# Patient Record
Sex: MALE | Race: WHITE | NOT HISPANIC OR LATINO | Employment: OTHER | URBAN - METROPOLITAN AREA
[De-identification: names, ages, dates, MRNs, and addresses within clinical notes are randomized per-mention and may not be internally consistent; named-entity substitution may affect disease eponyms.]

---

## 2020-09-20 ENCOUNTER — HOSPITAL ENCOUNTER (EMERGENCY)
Facility: HOSPITAL | Age: 45
Discharge: HOME | End: 2020-09-20
Attending: EMERGENCY MEDICINE
Payer: OTHER GOVERNMENT

## 2020-09-20 VITALS
TEMPERATURE: 98 F | SYSTOLIC BLOOD PRESSURE: 136 MMHG | RESPIRATION RATE: 16 BRPM | BODY MASS INDEX: 27.92 KG/M2 | HEIGHT: 70 IN | DIASTOLIC BLOOD PRESSURE: 86 MMHG | OXYGEN SATURATION: 96 % | WEIGHT: 195 LBS | HEART RATE: 68 BPM

## 2020-09-20 DIAGNOSIS — R07.9 CHEST PAIN: Primary | ICD-10-CM

## 2020-09-20 LAB
ALBUMIN SERPL BCP-MCNC: 4.7 G/DL (ref 3.5–5.2)
ALP SERPL-CCNC: 37 U/L (ref 55–135)
ALT SERPL W/O P-5'-P-CCNC: 55 U/L (ref 10–44)
ANION GAP SERPL CALC-SCNC: 12 MMOL/L (ref 8–16)
AST SERPL-CCNC: 28 U/L (ref 10–40)
BASOPHILS # BLD AUTO: 0.08 K/UL (ref 0–0.2)
BASOPHILS NFR BLD: 1.3 % (ref 0–1.9)
BILIRUB SERPL-MCNC: 1.5 MG/DL (ref 0.1–1)
BUN SERPL-MCNC: 11 MG/DL (ref 6–20)
CALCIUM SERPL-MCNC: 9 MG/DL (ref 8.7–10.5)
CHLORIDE SERPL-SCNC: 105 MMOL/L (ref 95–110)
CK MB SERPL-MCNC: 1.2 NG/ML (ref 0.1–6.5)
CK SERPL-CCNC: 166 U/L (ref 20–200)
CO2 SERPL-SCNC: 24 MMOL/L (ref 23–29)
CREAT SERPL-MCNC: 0.8 MG/DL (ref 0.5–1.4)
D DIMER PPP IA.FEU-MCNC: 0.49 UG/ML FEU
DIFFERENTIAL METHOD: NORMAL
EOSINOPHIL # BLD AUTO: 0.4 K/UL (ref 0–0.5)
EOSINOPHIL NFR BLD: 5.8 % (ref 0–8)
ERYTHROCYTE [DISTWIDTH] IN BLOOD BY AUTOMATED COUNT: 11.5 % (ref 11.5–14.5)
EST. GFR  (AFRICAN AMERICAN): >60 ML/MIN/1.73 M^2
EST. GFR  (NON AFRICAN AMERICAN): >60 ML/MIN/1.73 M^2
GLUCOSE SERPL-MCNC: 82 MG/DL (ref 70–110)
HCT VFR BLD AUTO: 43.9 % (ref 40–54)
HGB BLD-MCNC: 15.4 G/DL (ref 14–18)
IMM GRANULOCYTES # BLD AUTO: 0.02 K/UL (ref 0–0.04)
IMM GRANULOCYTES NFR BLD AUTO: 0.3 % (ref 0–0.5)
INR PPP: 1.1
LYMPHOCYTES # BLD AUTO: 2.3 K/UL (ref 1–4.8)
LYMPHOCYTES NFR BLD: 36.7 % (ref 18–48)
MAGNESIUM SERPL-MCNC: 2.1 MG/DL (ref 1.6–2.6)
MCH RBC QN AUTO: 29.6 PG (ref 27–31)
MCHC RBC AUTO-ENTMCNC: 35.1 G/DL (ref 32–36)
MCV RBC AUTO: 84 FL (ref 82–98)
MONOCYTES # BLD AUTO: 0.4 K/UL (ref 0.3–1)
MONOCYTES NFR BLD: 6.2 % (ref 4–15)
NEUTROPHILS # BLD AUTO: 3.1 K/UL (ref 1.8–7.7)
NEUTROPHILS NFR BLD: 49.7 % (ref 38–73)
NRBC BLD-RTO: 0 /100 WBC
PLATELET # BLD AUTO: 255 K/UL (ref 150–350)
PMV BLD AUTO: 9.4 FL (ref 9.2–12.9)
POTASSIUM SERPL-SCNC: 3.7 MMOL/L (ref 3.5–5.1)
PROT SERPL-MCNC: 7.2 G/DL (ref 6–8.4)
PROTHROMBIN TIME: 13.3 SEC (ref 10.6–14.8)
RBC # BLD AUTO: 5.2 M/UL (ref 4.6–6.2)
SARS-COV-2 RDRP RESP QL NAA+PROBE: NEGATIVE
SODIUM SERPL-SCNC: 141 MMOL/L (ref 136–145)
TROPONIN I SERPL DL<=0.01 NG/ML-MCNC: <0.03 NG/ML
TROPONIN I SERPL DL<=0.01 NG/ML-MCNC: <0.03 NG/ML
WBC # BLD AUTO: 6.16 K/UL (ref 3.9–12.7)

## 2020-09-20 PROCEDURE — 85379 FIBRIN DEGRADATION QUANT: CPT

## 2020-09-20 PROCEDURE — 84484 ASSAY OF TROPONIN QUANT: CPT | Mod: 91

## 2020-09-20 PROCEDURE — 93010 EKG 12-LEAD: ICD-10-PCS | Mod: ,,, | Performed by: SPECIALIST

## 2020-09-20 PROCEDURE — 99285 EMERGENCY DEPT VISIT HI MDM: CPT | Mod: 25

## 2020-09-20 PROCEDURE — 93005 ELECTROCARDIOGRAM TRACING: CPT | Performed by: SPECIALIST

## 2020-09-20 PROCEDURE — 25000003 PHARM REV CODE 250: Performed by: EMERGENCY MEDICINE

## 2020-09-20 PROCEDURE — 63600175 PHARM REV CODE 636 W HCPCS: Performed by: EMERGENCY MEDICINE

## 2020-09-20 PROCEDURE — 93010 ELECTROCARDIOGRAM REPORT: CPT | Mod: 76,,, | Performed by: SPECIALIST

## 2020-09-20 PROCEDURE — 85025 COMPLETE CBC W/AUTO DIFF WBC: CPT

## 2020-09-20 PROCEDURE — 93010 ELECTROCARDIOGRAM REPORT: CPT | Mod: ,,, | Performed by: SPECIALIST

## 2020-09-20 PROCEDURE — 84484 ASSAY OF TROPONIN QUANT: CPT

## 2020-09-20 PROCEDURE — 83735 ASSAY OF MAGNESIUM: CPT

## 2020-09-20 PROCEDURE — 82550 ASSAY OF CK (CPK): CPT

## 2020-09-20 PROCEDURE — U0002 COVID-19 LAB TEST NON-CDC: HCPCS

## 2020-09-20 PROCEDURE — 96374 THER/PROPH/DIAG INJ IV PUSH: CPT

## 2020-09-20 PROCEDURE — 80053 COMPREHEN METABOLIC PANEL: CPT

## 2020-09-20 PROCEDURE — 82553 CREATINE MB FRACTION: CPT

## 2020-09-20 PROCEDURE — 85610 PROTHROMBIN TIME: CPT

## 2020-09-20 RX ORDER — KETOROLAC TROMETHAMINE 30 MG/ML
15 INJECTION, SOLUTION INTRAMUSCULAR; INTRAVENOUS
Status: COMPLETED | OUTPATIENT
Start: 2020-09-20 | End: 2020-09-20

## 2020-09-20 RX ORDER — ASPIRIN 325 MG
325 TABLET ORAL
Status: COMPLETED | OUTPATIENT
Start: 2020-09-20 | End: 2020-09-20

## 2020-09-20 RX ORDER — NAPROXEN 375 MG/1
375 TABLET ORAL 2 TIMES DAILY PRN
Qty: 10 TABLET | Refills: 0 | Status: SHIPPED | OUTPATIENT
Start: 2020-09-20 | End: 2020-09-25

## 2020-09-20 RX ADMIN — KETOROLAC TROMETHAMINE 15 MG: 30 INJECTION, SOLUTION INTRAMUSCULAR at 05:09

## 2020-09-20 RX ADMIN — ASPIRIN 325 MG ORAL TABLET 325 MG: 325 PILL ORAL at 02:09

## 2020-09-20 NOTE — ED PROVIDER NOTES
Encounter Date: 9/20/2020       History     Chief Complaint   Patient presents with    Chest Pain     45-year-old male with history of borderline hypertension.  Patient presents emergency department with complaint of new onset left-sided chest pain and elevated blood pressure which was noted today.  Patient states that his pain is pressure-like quality which is worsened by taking a deep breath.  Patient states is a smoker, does have family history coronary artery disease.  Patient states pain is associated with generalized weakness as well.        Review of patient's allergies indicates:  No Known Allergies  No past medical history on file.  No past surgical history on file.  No family history on file.  Social History     Tobacco Use    Smoking status: Not on file   Substance Use Topics    Alcohol use: Not on file    Drug use: Not on file     Review of Systems   Constitutional: Negative for fever.   HENT: Negative for sore throat.    Respiratory: Negative for shortness of breath.    Cardiovascular: Positive for chest pain. Negative for palpitations.   Gastrointestinal: Negative for nausea and vomiting.   Genitourinary: Negative for dysuria.   Musculoskeletal: Negative for back pain.   Skin: Negative for rash.   Neurological: Negative for weakness, light-headedness and headaches.   Hematological: Does not bruise/bleed easily.       Physical Exam     Initial Vitals [09/20/20 1411]   BP Pulse Resp Temp SpO2   (!) 145/99 73 20 98.6 °F (37 °C) 98 %      MAP       --         Physical Exam    Nursing note and vitals reviewed.  Constitutional: He appears well-developed and well-nourished.   HENT:   Head: Normocephalic and atraumatic.   Nose: Nose normal.   Mouth/Throat: Oropharynx is clear and moist.   Eyes: Conjunctivae and EOM are normal. Pupils are equal, round, and reactive to light. No scleral icterus.   Neck: Normal range of motion. Neck supple.   Cardiovascular: Normal rate, regular rhythm, normal heart sounds  and intact distal pulses. Exam reveals no gallop and no friction rub.    No murmur heard.  Pulmonary/Chest: Breath sounds normal. No stridor. No respiratory distress. He has no wheezes.   Abdominal: Soft. Bowel sounds are normal. He exhibits no mass. There is no abdominal tenderness. There is no rebound and no guarding.   Musculoskeletal: Normal range of motion. No edema.   Lymphadenopathy:     He has no cervical adenopathy.   Neurological: He is alert and oriented to person, place, and time. He has normal strength and normal reflexes. No cranial nerve deficit or sensory deficit. GCS score is 15. GCS eye subscore is 4. GCS verbal subscore is 5. GCS motor subscore is 6.   Skin: Skin is warm and dry. Capillary refill takes less than 2 seconds. No rash noted.   Psychiatric: He has a normal mood and affect. His behavior is normal. Judgment and thought content normal.         ED Course   Procedures  Labs Reviewed   COMPREHENSIVE METABOLIC PANEL - Abnormal; Notable for the following components:       Result Value    Total Bilirubin 1.5 (*)     Alkaline Phosphatase 37 (*)     ALT 55 (*)     All other components within normal limits   MAGNESIUM   CBC W/ AUTO DIFFERENTIAL   PROTIME-INR   TROPONIN I   SARS-COV-2 RNA AMPLIFICATION, QUAL   CK   CK-MB   D DIMER, QUANTITATIVE   TROPONIN I     EKG Readings: (Independently Interpreted)   Initial Reading: No STEMI. Rhythm: Normal Sinus Rhythm. Ectopy: No Ectopy. Axis: Normal.   Normal sinus rhythm, 60, nonspecific T-wave changes.     ECG Results          EKG 12-lead (In process)  Result time 09/20/20 18:55:30    In process by Interface, Lab In [facility] (09/20/20 18:55:30)                 Narrative:    Test Reason : R07.9,    Vent. Rate : 068 BPM     Atrial Rate : 068 BPM     P-R Int : 156 ms          QRS Dur : 102 ms      QT Int : 408 ms       P-R-T Axes : 064 -21 015 degrees     QTc Int : 433 ms    Normal sinus rhythm  Voltage criteria for left ventricular hypertrophy  Abnormal  ECG  When compared with ECG of 20-SEP-2020 14:20,  No significant change was found    Referred By: [email or web address]   SELF           Confirmed By:                              EKG 12-LEAD (In process)  Result time 09/20/20 14:47:53    In process by Interface, Lab In [facility] (09/20/20 14:47:53)                 Narrative:    Test Reason : R07.9,    Vent. Rate : 060 BPM     Atrial Rate : 060 BPM     P-R Int : 170 ms          QRS Dur : 092 ms      QT Int : 400 ms       P-R-T Axes : 057 -21 005 degrees     QTc Int : 400 ms    Normal sinus rhythm  Voltage criteria for left ventricular hypertrophy  Abnormal ECG  No previous ECGs available    Referred By: [email or web address]   SELF           Confirmed By:                             Imaging Results          X-ray Chest AP Portable (Final result)  Result time 09/20/20 15:02:41    Final result by Evan Chamberlain Jr., MD (09/20/20 15:02:41)                 Narrative:    PORTABLE CHEST X-RAY SINGLE VIEW    HISTORY: Chest pain    PREVIOUS STUDIES: None available    FINDINGS:  The heart size and pulmonary vascularity are within the range of  normal.  There is no significant hilar nor mediastinal process.  The aerated lungs are well expanded and clear.  The right and left CP angles are rather sharp.  The osseous structures show nothing unusual.    IMPRESSION:   Negative chest.    Electronically Signed by Evan NINO on 9/20/2020 3:40 PM                               Medical Decision Making:   Initial Assessment:   45-year-old male presents with left-sided pleuritic chest pain onset this morning with associated generalized weakness.  Differential Diagnosis:   Costochondritis, ACS, pulmonary embolism, pneumothorax,  Clinical Tests:   Lab Tests: Ordered and Reviewed  Radiological Study: Ordered and Reviewed  Medical Tests: Ordered and Reviewed              [place] of Care:  Assumed care from Dr. Maria pending repeat Trop.  Agree with their assessment and plan unless otherwise noted.   Reassessed.  Patient lying in bed in no acute distress.  Repeat EKG shows normal sinus rhythm without acute ST changes and normal intervals.  Single T-wave inversion in lead III.  This is all consistent with the previous EKG.  Repeat troponin is less than 0.03.  Low suspicion for cardiac cause of patient's pain.  Suspect MSK versus pleuritic pain.  Will prescribe short course of NSAID.  Scheduled appointment with [facility] for further evaluation.  Given strict return precautions.  Patient and family at bedside understand the plan.    Ethan Clinton MD  Emergency Medicine  9/20/2020 7:32 PM                    Clinical Impression:       ICD-10-CM ICD-9-CM   1. Chest pain  R07.9 786.50                          ED Disposition Condition    Discharge Stable        ED Prescriptions     Medication Sig Dispense Start Date End Date Auth. Provider    naproxen (EC-NAPROSYN) 375 MG TbEC EC tablet Take 1 tablet (375 mg total) by mouth 2 (two) times daily as needed (Pain). 10 tablet 9/20/2020 9/25/2020 Ethan Clinton MD        Follow-up Information     Follow up With Specialties Details Why Contact Info    [facility]   As scheduled, For further evaluation of your symptoms. [address] [facility] [address]  [phone]                                         Ethan Clinton MD  09/20/20 1937

## 2020-09-20 NOTE — ED NOTES
Pt presents to the ED with c/o chest pain. Pt states right sided chest pain that started at 12pm today. Pt denies sob. Pt states pain is worse when he takes a deep breath. Pt denies headache, abdominal pain, weakness, or any other complaints at this time.

## 2020-09-20 NOTE — ED NOTES
Pt resting in bed. rr even and unlabored on ra. Nadn. Pt denies any pain at this time. Updated on plan of care. Pt verbalized understanding.

## 2020-09-21 NOTE — ED NOTES
Pt educated on discharge instructions, medications, and follow up care. Pt verbalized understanding. Pt stable at time of discharge.

## 2024-11-07 ENCOUNTER — OFFICE VISIT (OUTPATIENT)
Dept: FAMILY MEDICINE | Facility: CLINIC | Age: 49
End: 2024-11-07

## 2024-11-07 ENCOUNTER — TELEPHONE (OUTPATIENT)
Dept: FAMILY MEDICINE | Facility: CLINIC | Age: 49
End: 2024-11-07

## 2024-11-07 VITALS
SYSTOLIC BLOOD PRESSURE: 146 MMHG | WEIGHT: 219.63 LBS | OXYGEN SATURATION: 96 % | DIASTOLIC BLOOD PRESSURE: 98 MMHG | HEART RATE: 77 BPM | TEMPERATURE: 99 F | BODY MASS INDEX: 31.51 KG/M2

## 2024-11-07 DIAGNOSIS — Z00.01 ABNORMAL PHYSICAL EVALUATION: ICD-10-CM

## 2024-11-07 DIAGNOSIS — Z76.89 ENCOUNTER TO ESTABLISH CARE: Primary | ICD-10-CM

## 2024-11-07 DIAGNOSIS — R03.0 ELEVATED BP WITHOUT DIAGNOSIS OF HYPERTENSION: ICD-10-CM

## 2024-11-07 DIAGNOSIS — Z53.20 COLON CANCER SCREENING DECLINED: ICD-10-CM

## 2024-11-07 PROCEDURE — 99999 PR PBB SHADOW E&M-EST. PATIENT-LVL IV: CPT | Mod: PBBFAC,,,

## 2024-11-07 PROCEDURE — 99214 OFFICE O/P EST MOD 30 MIN: CPT | Mod: PBBFAC,PN

## 2024-11-07 NOTE — TELEPHONE ENCOUNTER
Advised patient that Dr. Jules is leaving the practice and that he will need to call back to [phone] to set up a new chart and appointment.

## 2024-11-07 NOTE — TELEPHONE ENCOUNTER
Attempted to call patient to see if patient was establishing care at upcoming appt. Patient's number is not in service.

## 2024-11-07 NOTE — PROGRESS NOTES
SUBJECTIVE:      Patient ID: Nelly Lucio is a 49 y.o. male.    Chief Complaint: Establish Care and Wants lab work orders    History of Present Illness    CHIEF COMPLAINT:  Patient presents today to establish care and for lab work.He has not seen a PCP in 4 years.  used for Visit.     MEDICAL HISTORY:  He denies any significant medical history, including cardiac, pulmonary, or GI issues. He reports a single emergency room visit approximately 4 years ago, but provides no further details.    CURRENT SYMPTOMS:  He reports difficulty with near vision when using his phone or viewing close objects. He experiences occasional headaches, occurring approximately once every 1-2 weeks. He denies chest pain, shortness of breath, or chest tightness.  BP in office 140/100, recheck 146/98. Endorses high sodium intake, coffee multiple times a day, and sedentary lifestyle.    SOCIAL HISTORY:  He works in a kitchen. He denies smoking and reports minimal alcohol consumption, having one beer occasionally. He denies illicit drug use. He reports no regular exercise routine. He tries to maintain a healthy diet, often consuming salads, but acknowledges cheese consumption. He reports some caffeine intake.    FAMILY HISTORY:  He reports no known family history of colon cancer. His parents are still living and are approximately 90 years old. Very limited information on history.       MARITAL STATUS:  He is .      ROS:  General: -fever, -chills, -fatigue, -weight gain, -weight loss  Eyes: +vision changes, -redness, -discharge  ENT: -ear pain, -nasal congestion, -sore throat  Cardiovascular: -chest pain, -palpitations, -lower extremity edema  Respiratory: -cough, -shortness of breath  Gastrointestinal: -abdominal pain, -nausea, -vomiting, -diarrhea, -constipation, -blood in stool, -change in bowel habits  Genitourinary: -dysuria, -hematuria, -frequency  Musculoskeletal: -joint pain, -muscle pain  Skin: -rash,  -lesion  Neurological: +headache, -dizziness, -numbness, -tingling  Psychiatric: -anxiety, -depression, -sleep difficulty        Review of Systems       History reviewed. No pertinent past medical history.  No current outpatient medications on file.     No current facility-administered medications for this visit.     Review of patient's allergies indicates:  No Known Allergies   Past Surgical History:   Procedure Laterality Date    FINGER SURGERY       Social History     Socioeconomic History    Marital status:    Tobacco Use    Smoking status: Former     Current packs/day: 0.00     Average packs/day: 1 pack/day for 23.0 years (23.0 ttl pk-yrs)     Types: Cigarettes     Start date:      Quit date: 2019     Years since quittin.8    Smokeless tobacco: Never   Substance and Sexual Activity    Alcohol use: Not Currently     Comment: Social Drinker    Drug use: Never    Sexual activity: Yes     Partners: Female     No family history on file.       OBJECTIVE:      Vitals:    24 1501 24 1530   BP: (!) 140/100 (!) 146/98   BP Location: Left arm    Patient Position: Sitting    Pulse: 77    Temp: 98.5 °F (36.9 °C)    TempSrc: Oral    SpO2: 96%    Weight: 99.6 kg (219 lb 9.6 oz)      Physical Exam  Vitals and nursing note reviewed.   Constitutional:       General: He is not in acute distress.     Appearance: Normal appearance. He is well-developed. He is not ill-appearing.   HENT:      Head: Normocephalic and atraumatic.      Right Ear: Tympanic membrane, ear canal and external ear normal.      Left Ear: Tympanic membrane, ear canal and external ear normal.      Nose: Nose normal.      Mouth/Throat:      Mouth: Mucous membranes are moist.      Pharynx: Oropharynx is clear. No oropharyngeal exudate.   Eyes:      General: No scleral icterus.     Extraocular Movements: Extraocular movements intact.      Conjunctiva/sclera: Conjunctivae normal.      Pupils: Pupils are equal, round, and reactive to light.    Neck:      Thyroid: No thyroid mass or thyromegaly.      Trachea: Trachea normal.   Cardiovascular:      Rate and Rhythm: Normal rate and regular rhythm.      Heart sounds: Normal heart sounds. No murmur heard.  Pulmonary:      Effort: Pulmonary effort is normal. No respiratory distress.      Breath sounds: Normal breath sounds. No wheezing or rales.   Abdominal:      General: Bowel sounds are normal. There is no distension.      Palpations: Abdomen is soft. There is no mass.      Tenderness: There is no abdominal tenderness.   Musculoskeletal:         General: Normal range of motion.      Cervical back: Normal range of motion and neck supple.   Lymphadenopathy:      Cervical: No cervical adenopathy.   Skin:     General: Skin is warm and dry.      Coloration: Skin is not pale.      Findings: No rash.   Neurological:      Mental Status: He is alert and oriented to person, place, and time.      Gait: Gait normal.   Psychiatric:         Mood and Affect: Mood normal.         Behavior: Behavior normal.         Thought Content: Thought content normal.         Judgment: Judgment normal.            Assessment:       1. Encounter to establish care    2. Abnormal physical evaluation    3. Elevated BP without diagnosis of hypertension    4. Colon cancer screening declined        Plan:       Assessment & Plan    Evaluated patient for annual wellness visit and lab work  Noted elevated blood pressure (140/100, rechecked at 146/98)  Considered lifestyle modifications as first-line approach for managing hypertension before pharmacological intervention  Assessed for potential contributing factors to hypertension, including caffeine intake, sodium consumption, and physical activity level  Patient declined colon cancer screening at this time    PLAN SUMMARY:  Fasting lab work ordered for cholesterol and annual screening tests  Follow up in 1 month to review lab results and recheck blood pressure  Start walking for 30 minutes every  other day  Reduce sodium intake  Increase water intake  Reduce caffeine intake to 1 coffee per day  Consider stress reduction techniques if experiencing elevated stress  Choose between colonoscopy or Cologuard for colon cancer screening    HYPERTENSION:  Explained importance of maintaining blood pressure below 130/80 to reduce risk of stroke, heart attack, eye damage, and kidney damage.  Patient to reduce sodium intake.  Patient to reduce caffeine intake to 1 coffee per day.  Recommend increasing water intake.    COLON CANCER SCREENING:  Discussed new guidelines for colon cancer screening starting at age 45.  Explained two colon cancer screening options: colonoscopy (good for 10 years if normal) and Cologuard (good for 3 years if normal).    WEIGHT MANAGEMENT:  Recommend starting walking for 30 minutes every other day.    STRESS MANAGEMENT:  Patient to consider stress reduction techniques if experiencing elevated stress levels.    LABS:  Fasting lab work ordered for cholesterol and other annual screening tests.  Patient to come in for fasting lab work, can arrive as early as 7:30 AM on a weekday (Monday through Friday).    FOLLOW UP:  Follow up in 1 month to review lab results and recheck blood pressure.        Encounter to establish care  -     CBC Auto Differential; Future; Expected date: 11/07/2024  -     Comprehensive Metabolic Panel; Future; Expected date: 11/07/2024    Abnormal physical evaluation  -     CBC Auto Differential; Future; Expected date: 11/07/2024  -     Comprehensive Metabolic Panel; Future; Expected date: 11/07/2024  -     Lipid Panel; Future; Expected date: 11/07/2024  -     TSH; Future; Expected date: 11/07/2024  -     Hepatitis C Antibody; Future; Expected date: 11/07/2024    Elevated BP without diagnosis of hypertension    Colon cancer screening declined    Counseled on age and gender appropriate medical preventative services, including cancer screenings, immunizations, overall nutritional  health, need for a consistent exercise regimen and an overall push towards maintaining a vigorous and active lifestyle.    - Limit: red meat, butter, fried foods, cheese, and other foods that have a lot of saturated fat. Consume: lean meats, fish, fruits, vegetables, whole grains, beans, lentils, and nuts. Adequate daily hydration.  - Instructed on guidelines recommending 150 minutes per week of brisk exercise and healthy lifestyle. Recommended well screenings (including immunizations) reviewed with patient. Discussed outstanding health maintenance and rationale for completion.     Follow up for HTN, LABS.      11/7/2024 DEMETRIA Rankin, CHAVEZP  This note was generated with the assistance of ambient listening technology. Verbal consent was obtained by the patient and accompanying visitor(s) for the recording of patient appointment to facilitate this note. I attest to having reviewed and edited the generated note for accuracy, though some syntax or spelling errors may persist. Please contact the author of this note for any clarification.

## 2024-11-07 NOTE — TELEPHONE ENCOUNTER
----- Message from [employer] sent at 11/7/2024 11:17 AM CST -----  Type:  Sooner Appointment Request    Caller is requesting a sooner appointment.  Caller declined first available appointment listed below.  Caller will not accept being placed on the waitlist and is requesting a message be sent to doctor.  Name of Caller:Sonu/son     When is the first available appointment?  Symptoms: est/ care and blood work    Would the patient rather a call back or a response via [facility]? call  Best Call Back Number: [phone]      Additional Information:  Please call back to advise. Thank you!

## 2024-11-11 ENCOUNTER — LAB VISIT (OUTPATIENT)
Dept: LAB | Facility: HOSPITAL | Age: 49
End: 2024-11-11

## 2024-11-11 DIAGNOSIS — Z00.01 ABNORMAL PHYSICAL EVALUATION: ICD-10-CM

## 2024-11-11 DIAGNOSIS — Z76.89 ENCOUNTER TO ESTABLISH CARE: ICD-10-CM

## 2024-11-11 LAB
ALBUMIN SERPL BCP-MCNC: 4.7 G/DL (ref 3.5–5.2)
ALP SERPL-CCNC: 41 U/L (ref 55–135)
ALT SERPL W/O P-5'-P-CCNC: 36 U/L (ref 10–44)
ANION GAP SERPL CALC-SCNC: 5 MMOL/L (ref 8–16)
AST SERPL-CCNC: 19 U/L (ref 10–40)
BASOPHILS # BLD AUTO: 0.08 K/UL (ref 0–0.2)
BASOPHILS NFR BLD: 1.1 % (ref 0–1.9)
BILIRUB SERPL-MCNC: 0.7 MG/DL (ref 0.1–1)
BUN SERPL-MCNC: 14 MG/DL (ref 6–20)
CALCIUM SERPL-MCNC: 9.7 MG/DL (ref 8.7–10.5)
CHLORIDE SERPL-SCNC: 108 MMOL/L (ref 95–110)
CHOLEST SERPL-MCNC: 211 MG/DL (ref 120–199)
CHOLEST/HDLC SERPL: 5 {RATIO} (ref 2–5)
CO2 SERPL-SCNC: 27 MMOL/L (ref 23–29)
CREAT SERPL-MCNC: 0.9 MG/DL (ref 0.5–1.4)
DIFFERENTIAL METHOD BLD: ABNORMAL
EOSINOPHIL # BLD AUTO: 0.5 K/UL (ref 0–0.5)
EOSINOPHIL NFR BLD: 6.8 % (ref 0–8)
ERYTHROCYTE [DISTWIDTH] IN BLOOD BY AUTOMATED COUNT: 11.9 % (ref 11.5–14.5)
EST. GFR  (NO RACE VARIABLE): >60 ML/MIN/1.73 M^2
GLUCOSE SERPL-MCNC: 106 MG/DL (ref 70–110)
HCT VFR BLD AUTO: 43.3 % (ref 40–54)
HDLC SERPL-MCNC: 42 MG/DL (ref 40–75)
HDLC SERPL: 19.9 % (ref 20–50)
HGB BLD-MCNC: 14.9 G/DL (ref 14–18)
IMM GRANULOCYTES # BLD AUTO: 0.02 K/UL (ref 0–0.04)
IMM GRANULOCYTES NFR BLD AUTO: 0.3 % (ref 0–0.5)
LDLC SERPL CALC-MCNC: 143.2 MG/DL (ref 63–159)
LYMPHOCYTES # BLD AUTO: 2.9 K/UL (ref 1–4.8)
LYMPHOCYTES NFR BLD: 40.8 % (ref 18–48)
MCH RBC QN AUTO: 29.4 PG (ref 27–31)
MCHC RBC AUTO-ENTMCNC: 34.4 G/DL (ref 32–36)
MCV RBC AUTO: 85 FL (ref 82–98)
MONOCYTES # BLD AUTO: 0.5 K/UL (ref 0.3–1)
MONOCYTES NFR BLD: 7.7 % (ref 4–15)
NEUTROPHILS # BLD AUTO: 3 K/UL (ref 1.8–7.7)
NEUTROPHILS NFR BLD: 43.3 % (ref 38–73)
NONHDLC SERPL-MCNC: 169 MG/DL
NRBC BLD-RTO: 0 /100 WBC
PLATELET # BLD AUTO: 278 K/UL (ref 150–450)
PMV BLD AUTO: 9.1 FL (ref 9.2–12.9)
POTASSIUM SERPL-SCNC: 4.3 MMOL/L (ref 3.5–5.1)
PROT SERPL-MCNC: 7.8 G/DL (ref 6–8.4)
RBC # BLD AUTO: 5.07 M/UL (ref 4.6–6.2)
SODIUM SERPL-SCNC: 140 MMOL/L (ref 136–145)
TRIGL SERPL-MCNC: 129 MG/DL (ref 30–150)
TSH SERPL DL<=0.005 MIU/L-ACNC: 1.52 UIU/ML (ref 0.34–5.6)
WBC # BLD AUTO: 7.03 K/UL (ref 3.9–12.7)

## 2024-11-11 PROCEDURE — 84443 ASSAY THYROID STIM HORMONE: CPT

## 2024-11-11 PROCEDURE — 80061 LIPID PANEL: CPT

## 2024-11-11 PROCEDURE — 85025 COMPLETE CBC W/AUTO DIFF WBC: CPT

## 2024-11-11 PROCEDURE — 80053 COMPREHEN METABOLIC PANEL: CPT

## 2024-11-11 PROCEDURE — 86803 HEPATITIS C AB TEST: CPT

## 2024-11-12 LAB — HCV AB S/CO SERPL IA: NON REACTIVE

## 2024-11-13 ENCOUNTER — TELEPHONE (OUTPATIENT)
Dept: FAMILY MEDICINE | Facility: CLINIC | Age: 49
End: 2024-11-13

## 2024-11-13 NOTE — TELEPHONE ENCOUNTER
Called patient to discuss lab results.  provided. Patient's son answered and said he would like to translate for his father. Patient was beside son and expressed verbal understanding. Informed them that labs were good and to follow up in a month as scheduled. Patient's son said that he does not speak the documented [place] language despite talking to an [place]  at last appt. Verbal understanding expressed about lab results.

## 2024-11-13 NOTE — TELEPHONE ENCOUNTER
----- Message from Naikta sent at 11/13/2024 11:52 AM CST -----  Pt is asking for his lab results.   [phone]

## 2024-11-13 NOTE — TELEPHONE ENCOUNTER
----- Message from Argelia Zambrano NP sent at 11/13/2024  1:28 PM CST -----  Please let patient know that I have seen his labs and they look great overall.  His cholesterol looks good, kidney and liver function are within normal limits, no anemia present, his thyroid function is normal.  Please remind him to continue making lifestyle modifications and continue to follow up in clinic next month for blood pressure.